# Patient Record
Sex: MALE | Race: WHITE | NOT HISPANIC OR LATINO
[De-identification: names, ages, dates, MRNs, and addresses within clinical notes are randomized per-mention and may not be internally consistent; named-entity substitution may affect disease eponyms.]

---

## 2017-11-20 ENCOUNTER — RESULT REVIEW (OUTPATIENT)
Age: 72
End: 2017-11-20

## 2019-03-25 ENCOUNTER — EMERGENCY (EMERGENCY)
Facility: HOSPITAL | Age: 74
LOS: 1 days | Discharge: HOME | End: 2019-03-25
Attending: EMERGENCY MEDICINE | Admitting: EMERGENCY MEDICINE
Payer: MEDICARE

## 2019-03-25 VITALS
RESPIRATION RATE: 18 BRPM | DIASTOLIC BLOOD PRESSURE: 88 MMHG | HEART RATE: 88 BPM | OXYGEN SATURATION: 96 % | SYSTOLIC BLOOD PRESSURE: 165 MMHG | HEIGHT: 72 IN | TEMPERATURE: 98 F | WEIGHT: 199.96 LBS

## 2019-03-25 DIAGNOSIS — E78.5 HYPERLIPIDEMIA, UNSPECIFIED: ICD-10-CM

## 2019-03-25 DIAGNOSIS — R10.31 RIGHT LOWER QUADRANT PAIN: ICD-10-CM

## 2019-03-25 DIAGNOSIS — Z88.0 ALLERGY STATUS TO PENICILLIN: ICD-10-CM

## 2019-03-25 DIAGNOSIS — R10.9 UNSPECIFIED ABDOMINAL PAIN: ICD-10-CM

## 2019-03-25 DIAGNOSIS — D72.829 ELEVATED WHITE BLOOD CELL COUNT, UNSPECIFIED: ICD-10-CM

## 2019-03-25 DIAGNOSIS — K59.00 CONSTIPATION, UNSPECIFIED: ICD-10-CM

## 2019-03-25 DIAGNOSIS — R33.9 RETENTION OF URINE, UNSPECIFIED: ICD-10-CM

## 2019-03-25 LAB
BASOPHILS # BLD AUTO: 0.03 K/UL — SIGNIFICANT CHANGE UP (ref 0–0.2)
BASOPHILS NFR BLD AUTO: 0.3 % — SIGNIFICANT CHANGE UP (ref 0–2)
EOSINOPHIL # BLD AUTO: 0.02 K/UL — SIGNIFICANT CHANGE UP (ref 0–0.5)
EOSINOPHIL NFR BLD AUTO: 0.2 % — SIGNIFICANT CHANGE UP (ref 0–6)
HCT VFR BLD CALC: 41.8 % — SIGNIFICANT CHANGE UP (ref 39–50)
HGB BLD-MCNC: 13.7 G/DL — SIGNIFICANT CHANGE UP (ref 13–17)
IMM GRANULOCYTES NFR BLD AUTO: 0.4 % — SIGNIFICANT CHANGE UP (ref 0–1.5)
LYMPHOCYTES # BLD AUTO: 0.71 K/UL — LOW (ref 1–3.3)
LYMPHOCYTES # BLD AUTO: 6.2 % — LOW (ref 13–44)
MCHC RBC-ENTMCNC: 29.6 PG — SIGNIFICANT CHANGE UP (ref 27–34)
MCHC RBC-ENTMCNC: 32.8 GM/DL — SIGNIFICANT CHANGE UP (ref 32–36)
MCV RBC AUTO: 90.3 FL — SIGNIFICANT CHANGE UP (ref 80–100)
MONOCYTES # BLD AUTO: 0.97 K/UL — HIGH (ref 0–0.9)
MONOCYTES NFR BLD AUTO: 8.5 % — SIGNIFICANT CHANGE UP (ref 2–14)
NEUTROPHILS # BLD AUTO: 9.64 K/UL — HIGH (ref 1.8–7.4)
NEUTROPHILS NFR BLD AUTO: 84.4 % — HIGH (ref 43–77)
NRBC # BLD: 0 /100 WBCS — SIGNIFICANT CHANGE UP (ref 0–0)
PLATELET # BLD AUTO: 196 K/UL — SIGNIFICANT CHANGE UP (ref 150–400)
RBC # BLD: 4.63 M/UL — SIGNIFICANT CHANGE UP (ref 4.2–5.8)
RBC # FLD: 13.4 % — SIGNIFICANT CHANGE UP (ref 10.3–14.5)
WBC # BLD: 11.41 K/UL — HIGH (ref 3.8–10.5)
WBC # FLD AUTO: 11.41 K/UL — HIGH (ref 3.8–10.5)

## 2019-03-25 PROCEDURE — 99284 EMERGENCY DEPT VISIT MOD MDM: CPT

## 2019-03-25 RX ORDER — IOHEXOL 300 MG/ML
30 INJECTION, SOLUTION INTRAVENOUS ONCE
Qty: 0 | Refills: 0 | Status: COMPLETED | OUTPATIENT
Start: 2019-03-25 | End: 2019-03-26

## 2019-03-25 NOTE — ED ADULT TRIAGE NOTE - CCCP TRG CHIEF CMPLNT
Anesthesia ROS/Med Hx    Overall Review:    Pt. has history of anesthetic complications (pONV)    Pulmonary Review:    The patient is a former smoker.  (quit 1987)    Neuro/Psych Review:    Negative for all neuro/psych ROS    Cardiovascular Review:    Pt. positive for hypertension    GI/HEPATIC/RENAL Review:  GI/Hepatic/Renal Review Comments: IBD  Colon and rectal cancer    End/Other Review:    Pt. positive for arthritis      Anesthesia Plan     ASA Status: 3  Anesthesia Type: MAC  Reviewed: Medications, Problem List, NPO Status, Patient Summary, Past Med History and Allergies  The proposed anesthetic plan, including its risks and benefits, have been discussed with the Patient - along with the risks and benefits of alternatives.  Questions were encouraged and answered and the patient and/or representative agrees to proceed.  Blood Products: Not Anticipated  Plan Comments:     Reason for Anesthesia Involvement: Age > 70 or < 18 years of age and ASA 3 or greater            Physical Exam  Mallampati: II  TM Distance: >3 FB  Neck ROM: Full  cardiovascular exam normal  pulmonary exam normal  Patient Demonstrates:  Obese  dental exam normal                  
abdominal pain

## 2019-03-25 NOTE — ED ADULT TRIAGE NOTE - ARRIVAL INFO ADDITIONAL COMMENTS
pt states he has abd pain and thinks he may be constipated.  last time pt urinated was this am however.

## 2019-03-25 NOTE — ED ADULT NURSE NOTE - OBJECTIVE STATEMENT
The pt is a 72 y/o male who came in to ED for evaluation of pelvic pain. Pt reports having difficulty urinating. Pt has hx of BPH with two prostate surgeries before.

## 2019-03-26 VITALS
TEMPERATURE: 98 F | RESPIRATION RATE: 18 BRPM | DIASTOLIC BLOOD PRESSURE: 77 MMHG | SYSTOLIC BLOOD PRESSURE: 150 MMHG | HEART RATE: 73 BPM | OXYGEN SATURATION: 95 %

## 2019-03-26 DIAGNOSIS — R10.2 PELVIC AND PERINEAL PAIN: ICD-10-CM

## 2019-03-26 LAB
ALBUMIN SERPL ELPH-MCNC: 4.3 G/DL — SIGNIFICANT CHANGE UP (ref 3.3–5)
ALP SERPL-CCNC: 63 U/L — SIGNIFICANT CHANGE UP (ref 40–120)
ALT FLD-CCNC: 17 U/L — SIGNIFICANT CHANGE UP (ref 10–45)
ANION GAP SERPL CALC-SCNC: 11 MMOL/L — SIGNIFICANT CHANGE UP (ref 5–17)
APPEARANCE UR: ABNORMAL
AST SERPL-CCNC: 21 U/L — SIGNIFICANT CHANGE UP (ref 10–40)
BACTERIA # UR AUTO: SIGNIFICANT CHANGE UP /HPF
BILIRUB SERPL-MCNC: 0.5 MG/DL — SIGNIFICANT CHANGE UP (ref 0.2–1.2)
BILIRUB UR-MCNC: NEGATIVE — SIGNIFICANT CHANGE UP
BUN SERPL-MCNC: 21 MG/DL — SIGNIFICANT CHANGE UP (ref 7–23)
CALCIUM SERPL-MCNC: 9 MG/DL — SIGNIFICANT CHANGE UP (ref 8.4–10.5)
CHLORIDE SERPL-SCNC: 108 MMOL/L — SIGNIFICANT CHANGE UP (ref 96–108)
CO2 SERPL-SCNC: 23 MMOL/L — SIGNIFICANT CHANGE UP (ref 22–31)
COLOR SPEC: ABNORMAL
CREAT SERPL-MCNC: 1.06 MG/DL — SIGNIFICANT CHANGE UP (ref 0.5–1.3)
DIFF PNL FLD: ABNORMAL
EPI CELLS # UR: SIGNIFICANT CHANGE UP /HPF (ref 0–5)
GLUCOSE SERPL-MCNC: 117 MG/DL — HIGH (ref 70–99)
GLUCOSE UR QL: NEGATIVE — SIGNIFICANT CHANGE UP
KETONES UR-MCNC: NEGATIVE — SIGNIFICANT CHANGE UP
LEUKOCYTE ESTERASE UR-ACNC: NEGATIVE — SIGNIFICANT CHANGE UP
LIDOCAIN IGE QN: 21 U/L — SIGNIFICANT CHANGE UP (ref 7–60)
NITRITE UR-MCNC: NEGATIVE — SIGNIFICANT CHANGE UP
PH UR: 7 — SIGNIFICANT CHANGE UP (ref 5–8)
POTASSIUM SERPL-MCNC: 4.2 MMOL/L — SIGNIFICANT CHANGE UP (ref 3.5–5.3)
POTASSIUM SERPL-SCNC: 4.2 MMOL/L — SIGNIFICANT CHANGE UP (ref 3.5–5.3)
PROT SERPL-MCNC: 6.6 G/DL — SIGNIFICANT CHANGE UP (ref 6–8.3)
PROT UR-MCNC: 30 MG/DL
RBC CASTS # UR COMP ASSIST: ABNORMAL /HPF
SODIUM SERPL-SCNC: 142 MMOL/L — SIGNIFICANT CHANGE UP (ref 135–145)
SP GR SPEC: 1.02 — SIGNIFICANT CHANGE UP (ref 1–1.03)
UROBILINOGEN FLD QL: 0.2 E.U./DL — SIGNIFICANT CHANGE UP
WBC UR QL: < 5 /HPF — SIGNIFICANT CHANGE UP

## 2019-03-26 PROCEDURE — 74177 CT ABD & PELVIS W/CONTRAST: CPT

## 2019-03-26 PROCEDURE — 87086 URINE CULTURE/COLONY COUNT: CPT

## 2019-03-26 PROCEDURE — 99284 EMERGENCY DEPT VISIT MOD MDM: CPT | Mod: 25

## 2019-03-26 PROCEDURE — 74177 CT ABD & PELVIS W/CONTRAST: CPT | Mod: 26

## 2019-03-26 PROCEDURE — 51701 INSERT BLADDER CATHETER: CPT

## 2019-03-26 PROCEDURE — 80053 COMPREHEN METABOLIC PANEL: CPT

## 2019-03-26 PROCEDURE — 83690 ASSAY OF LIPASE: CPT

## 2019-03-26 PROCEDURE — 85025 COMPLETE CBC W/AUTO DIFF WBC: CPT

## 2019-03-26 PROCEDURE — 81001 URINALYSIS AUTO W/SCOPE: CPT

## 2019-03-26 PROCEDURE — 36415 COLL VENOUS BLD VENIPUNCTURE: CPT

## 2019-03-26 RX ORDER — MINERAL OIL
133 OIL (ML) MISCELLANEOUS ONCE
Qty: 0 | Refills: 0 | Status: COMPLETED | OUTPATIENT
Start: 2019-03-26 | End: 2019-03-26

## 2019-03-26 RX ADMIN — IOHEXOL 30 MILLILITER(S): 300 INJECTION, SOLUTION INTRAVENOUS at 00:01

## 2019-03-26 RX ADMIN — Medication 133 MILLILITER(S): at 03:20

## 2019-03-26 NOTE — ED PROVIDER NOTE - PROGRESS NOTE DETAILS
Attempts at soto placement unsuccessful per RN with some blood in the urethral meatus. Urology consulted and placed coudete catheter with 00 cc of uop. Pt still with abd pain. Labs noted and mildly elevated WBC with shift. CT ordered and pending. Case endorsed to night team pending CT and dispo. Pt's PCP is Dr. Mateusz Manuel. Attempts at soto placement unsuccessful per RN with some blood in the urethral meatus. Urology consulted and placed coudete catheter with 200 cc of uop. Pt still with abd pain. Labs noted and mildly elevated WBC with shift. CT ordered and pending. CT abd/ pelvis with no appendicitis or acute surgical pathology but with constipation and stool in rectum. Rectal exam was done with soft stool palpated. Pt given enema to take home and do it in the comfort of his home. Bhatt removed per Urology. Labs/ studied noted. Pt comfortable with this plan.

## 2019-03-26 NOTE — ED PROVIDER NOTE - CLINICAL SUMMARY MEDICAL DECISION MAKING FREE TEXT BOX
72 yo M with PMH of anxiety, depression, GERD, Glaucoma, HLD, hx of urinary retention and BPH (with 2 procedures of "shaving of prostate"- urologist is Dr. Arcos), hx of b/l hernia repair at age 2 years old, p/w constipation and lower abd pain since this morning. Last BM was yesterday and formed and nb per pt. States that also developed urinary retention, but was able to urinate once in the shower with no dysuria. Tried to disimpact himself with a glove and "got some out" but them started having worsening lower abd pain and "rectal area". No fevers, chills, N,V, CP, SOB or flank pain. Attempts at soto placement unsuccessful per RN with some blood in the urethral meatus. Urology consulted and placed coudete catheter with 00 cc of uop. Pt still with abd pain. Labs noted and mildly elevated WBC with shift. CT ordered and pending. Case endorsed to night team pending CT and dispo. Per Urology pt does not have to go home with Soto and it can be removed prior to dc. UA with no infection. Pt's PCP is Dr. Mateusz Manuel. 72 yo M with PMH of anxiety, depression, GERD, Glaucoma, HLD, hx of urinary retention and BPH (with 2 procedures of "shaving of prostate"- urologist is Dr. Arcos), hx of b/l hernia repair at age 2 years old, p/w constipation and lower abd pain since this morning. Last BM was yesterday and formed and nb per pt. States that also developed urinary retention, but was able to urinate once in the shower with no dysuria. Tried to disimpact himself with a glove and "got some out" but them started having worsening lower abd pain and "rectal area". No fevers, chills, N,V, CP, SOB or flank pain. Attempts at soto placement unsuccessful per RN with some blood in the urethral meatus. Urology consulted and placed coudete catheter with 00 cc of uop. Pt still with abd pain. Labs noted and mildly elevated WBC with shift. CT abd/ pelvis noted and with no appendicitis or acute surgical pathology but with constipation and stool in rectum. Rectal exam was done with soft stool palpated. Pt given enema to take home and do it in the comfort of his home. Soto removed per Urology. Labs/ studied noted. Pt comfortable with this plan.

## 2019-03-26 NOTE — ED PROVIDER NOTE - OBJECTIVE STATEMENT
74 yo M with PMH of anxiety, depression, GERD, Glaucoma, HLD, hx of urinary retention and BPH (with 2 procedures of "shaving of prostate"- urologist is Dr. Arcos), hx of b/l hernia repair at age 2 years old, p/w constipation and lower abd pain since this morning. Last BM was yesterday and formed and nb per pt. States that also developed urinary retention, but was able to urinate once in the shower with no dysuria. Tried to disimpact himself with a glove and "got some out" but them started having worsening lower abd pain and "rectal area". No fevers, chills, N,V, CP, SOB or flank pain.

## 2019-03-26 NOTE — CONSULT NOTE ADULT - PROBLEM SELECTOR RECOMMENDATION 9
#18fr coude soto placed. 300 CC UO, clear  -f/u CT a/p , if ok can d/c without soto  -f/u Dr. Arcos as outpt.  -f/u u/a

## 2019-03-26 NOTE — CONSULT NOTE ADULT - SUBJECTIVE AND OBJECTIVE BOX
HPI: 74 yo male with h/o kidney stones and BPH, presents to ED c/o lower abdominal discomfort x 1 day. No fever/chills. Nurse unable to place soto. Called to evaluate. No dysuria/hematuria. +urinary frequency and urgency,      PAST MEDICAL & SURGICAL HISTORY:      MEDICATIONS  (STANDING):  iohexol 300 mG (iodine)/mL Oral Solution 30 milliLiter(s) Oral once    MEDICATIONS  (PRN):      Allergies    penicillin (Rash)    Intolerances        SOCIAL HISTORY:    FAMILY HISTORY:      Vital Signs Last 24 Hrs  T(C): 36.9 (25 Mar 2019 22:09), Max: 36.9 (25 Mar 2019 22:09)  T(F): 98.4 (25 Mar 2019 22:09), Max: 98.4 (25 Mar 2019 22:09)  HR: 88 (25 Mar 2019 22:09) (88 - 88)  BP: 165/88 (25 Mar 2019 22:09) (165/88 - 165/88)  BP(mean): --  RR: 18 (25 Mar 2019 22:09) (18 - 18)  SpO2: 96% (25 Mar 2019 22:09) (96% - 96%)    On PE:  General: alert and awake  Abdomen: soft, mild R lower abdominal discomfort. no rebound/guarding    : brp    EXT: no edema    LABS:                        13.7   11.41 )-----------( 196      ( 25 Mar 2019 23:40 )             41.8                 RADIOLOGY & ADDITIONAL STUDIES:

## 2019-03-26 NOTE — ED PROVIDER NOTE - CARE PROVIDER_API CALL
Mateusz Manuel (MD)  Gastroenterology; Internal Medicine  [address] [place], [place] [address]  Phone: [phone]  Fax: [phone]  Follow Up Time:

## 2019-03-26 NOTE — ED PROVIDER NOTE - GASTROINTESTINAL, MLM
Abdomen soft, (+) RLQ abd TTP, no rebound, no guarding, (+) palpable bladder. Rectal exam: good tone, soft brown stool in rectal vault.

## 2019-03-26 NOTE — ED PROVIDER NOTE - NSFOLLOWUPINSTRUCTIONS_ED_ALL_ED_FT
Please follow up with your primary care doctor in 2-3 days.    Abdominal Pain    Many things can cause abdominal pain. Many times, abdominal pain is not caused by a disease and will improve without treatment. Your health care provider will do a physical exam to determine if there is a dangerous cause of your pain; blood tests and imaging may help determine the cause of your pain. However, in many cases, no cause may be found and you may need further testing as an outpatient. Monitor your abdominal pain for any changes.     SEEK IMMEDIATE MEDICAL CARE IF YOU HAVE ANY OF THE FOLLOWING SYMPTOMS: worsening abdominal pain, uncontrollable vomiting, profuse diarrhea, inability to have bowel movements or pass gas, black or bloody stools, fever accompanying chest pain or back pain, or fainting. These symptoms may represent a serious problem that is an emergency. Do not wait to see if the symptoms will go away. Get medical help right away. Call 911 and do not drive yourself to the hospital.    Constipation    Constipation is when a person has fewer than three bowel movements a week, has difficulty having a bowel movement, or has stools that are dry, hard, or larger than normal. Other symptoms can include abdominal pain or bloating. As people grow older, constipation is more common. A low-fiber diet, not taking in enough fluids, and taking certain medicines, including opioid painkillers, may make constipation worse. Treatment varies but may include dietary modifications (more fiber-rich foods), lifestyle modifications, and possible medications.     SEEK IMMEDIATE MEDICAL CARE IF YOU HAVE ANY OF THE FOLLOWING SYMPTOMS: bright red blood in your stool, constipation for longer than 4 days, abdominal or rectal pain, unexplained weight loss, or inability to pass gas.

## 2019-03-26 NOTE — ED ADULT NURSE REASSESSMENT NOTE - NS ED NURSE REASSESS COMMENT FT1
Bhatt d/c'd per Urology team and Dr. Hall.  Patient tolerated removal well.  Patient was able to urinate shortly after completion.  Advised patient if urinary retention occurs to report to the nearest emergency room for further care.  Patient stated understanding.

## 2019-03-27 LAB
CULTURE RESULTS: NO GROWTH — SIGNIFICANT CHANGE UP
SPECIMEN SOURCE: SIGNIFICANT CHANGE UP
